# Patient Record
Sex: FEMALE | Race: WHITE | Employment: FULL TIME | URBAN - METROPOLITAN AREA
[De-identification: names, ages, dates, MRNs, and addresses within clinical notes are randomized per-mention and may not be internally consistent; named-entity substitution may affect disease eponyms.]

---

## 2024-03-11 ENCOUNTER — HOSPITAL ENCOUNTER (OUTPATIENT)
Dept: RADIOLOGY | Facility: CLINIC | Age: 24
Discharge: HOME | End: 2024-03-11
Payer: COMMERCIAL

## 2024-03-11 ENCOUNTER — OFFICE VISIT (OUTPATIENT)
Dept: PRIMARY CARE | Facility: CLINIC | Age: 24
End: 2024-03-11
Payer: COMMERCIAL

## 2024-03-11 VITALS
OXYGEN SATURATION: 98 % | SYSTOLIC BLOOD PRESSURE: 116 MMHG | HEART RATE: 62 BPM | BODY MASS INDEX: 19.56 KG/M2 | WEIGHT: 121.2 LBS | DIASTOLIC BLOOD PRESSURE: 70 MMHG

## 2024-03-11 DIAGNOSIS — M25.532 PAIN OF BOTH WRIST JOINTS: ICD-10-CM

## 2024-03-11 DIAGNOSIS — M25.531 PAIN OF BOTH WRIST JOINTS: ICD-10-CM

## 2024-03-11 DIAGNOSIS — E55.9 VITAMIN D DEFICIENCY: ICD-10-CM

## 2024-03-11 DIAGNOSIS — M25.531 PAIN OF BOTH WRIST JOINTS: Primary | ICD-10-CM

## 2024-03-11 DIAGNOSIS — M25.532 PAIN OF BOTH WRIST JOINTS: Primary | ICD-10-CM

## 2024-03-11 PROCEDURE — 73110 X-RAY EXAM OF WRIST: CPT | Mod: RT

## 2024-03-11 PROCEDURE — 1036F TOBACCO NON-USER: CPT | Performed by: PHYSICIAN ASSISTANT

## 2024-03-11 PROCEDURE — 99214 OFFICE O/P EST MOD 30 MIN: CPT | Performed by: PHYSICIAN ASSISTANT

## 2024-03-11 PROCEDURE — 73110 X-RAY EXAM OF WRIST: CPT | Mod: LT

## 2024-03-11 PROCEDURE — 73110 X-RAY EXAM OF WRIST: CPT | Mod: LEFT SIDE | Performed by: RADIOLOGY

## 2024-03-11 PROCEDURE — 73110 X-RAY EXAM OF WRIST: CPT | Mod: RIGHT SIDE | Performed by: RADIOLOGY

## 2024-03-11 ASSESSMENT — ENCOUNTER SYMPTOMS
ABDOMINAL DISTENTION: 0
ARTHRALGIAS: 1
VOMITING: 0
FEVER: 0
ABDOMINAL PAIN: 0
FATIGUE: 0
NAUSEA: 0
SHORTNESS OF BREATH: 0
BLOOD IN STOOL: 0
CONSTIPATION: 1

## 2024-03-11 ASSESSMENT — COLUMBIA-SUICIDE SEVERITY RATING SCALE - C-SSRS
2. HAVE YOU ACTUALLY HAD ANY THOUGHTS OF KILLING YOURSELF?: NO
1. IN THE PAST MONTH, HAVE YOU WISHED YOU WERE DEAD OR WISHED YOU COULD GO TO SLEEP AND NOT WAKE UP?: NO
6. HAVE YOU EVER DONE ANYTHING, STARTED TO DO ANYTHING, OR PREPARED TO DO ANYTHING TO END YOUR LIFE?: NO

## 2024-03-11 ASSESSMENT — PATIENT HEALTH QUESTIONNAIRE - PHQ9
2. FEELING DOWN, DEPRESSED OR HOPELESS: NOT AT ALL
SUM OF ALL RESPONSES TO PHQ9 QUESTIONS 1 AND 2: 0
1. LITTLE INTEREST OR PLEASURE IN DOING THINGS: NOT AT ALL

## 2024-03-11 ASSESSMENT — PAIN SCALES - GENERAL: PAINLEVEL: 2

## 2024-03-11 NOTE — PROGRESS NOTES
Subjective   Patient ID: Cathleen Marin is a 23 y.o. female who presents for Joint Pain (Pt has been having joint and wrist pain / nr) and Bloated (Pt has noticed having irregular bowel movements and bloating / nr).    HPI   Here with a few concerns. PMHx celiac disease. Feels like since she was diagnosed things are just falling apart.     Was previously having controlled BMs but over the past month has been experiencing constipation w/bloating. Denies abd pain, blood in stool. Has miralax that she's used in the past but wants to avoid using this daily again.    Also c/o b/l wrist pain. Intermittent, ongoing x 1 year. Worst w/pushups and typing at work. Occasionally feel stiff. No erythema or edema in the joints.      Review of Systems   Constitutional:  Negative for fatigue and fever.   Respiratory:  Negative for shortness of breath.    Cardiovascular:  Negative for chest pain.   Gastrointestinal:  Positive for constipation. Negative for abdominal distention, abdominal pain, blood in stool, nausea and vomiting.   Musculoskeletal:  Positive for arthralgias.   Skin:  Negative for rash.       Objective   /70   Pulse 62   Wt 55 kg (121 lb 3.2 oz)   SpO2 98%   BMI 19.56 kg/m²     Physical Exam  Constitutional:       Appearance: Normal appearance.   HENT:      Head: Normocephalic and atraumatic.   Eyes:      Extraocular Movements: Extraocular movements intact.      Conjunctiva/sclera: Conjunctivae normal.   Cardiovascular:      Rate and Rhythm: Normal rate and regular rhythm.      Heart sounds: Normal heart sounds.   Pulmonary:      Effort: Pulmonary effort is normal.      Breath sounds: Normal breath sounds. No wheezing or rhonchi.   Musculoskeletal:      Cervical back: Normal range of motion and neck supple.   Skin:     General: Skin is warm.      Findings: No rash.   Neurological:      General: No focal deficit present.      Mental Status: She is alert.         Assessment/Plan   Problem List Items  Addressed This Visit    None  Visit Diagnoses         Codes    Pain of both wrist joints    -  Primary M25.531, M25.532    Relevant Orders    BLANCA    C-reactive protein    Sedimentation Rate    XR wrist right 3+ views    XR wrist left 3+ views    Rheumatoid factor    Vitamin D deficiency     E55.9    Relevant Orders    Vitamin D 25-Hydroxy,Total (for eval of Vitamin D levels)          Will start w/imaging and labs for wrist pain. If no obvious finding, will refer to PT for arm strengthening.    Recommend dietary journal to see what could be cause of change in bowel habits. Also recommend functional med to discuss dietary changes.

## 2024-03-11 NOTE — PATIENT INSTRUCTIONS
Functional Medicine is a systems biology-based approach that focuses on identifying and addressing the root cause of disease. A diagnosis can be the result of more than one cause. For example, depression can be caused by many different factors, including inflammation. Likewise, a cause such as inflammation may lead to a number of different diagnoses, including depression. The precise manifestation of each cause depends on the individual’s genes, environment, and lifestyle, and only treatments that address the right cause will have lasting benefit beyond symptom suppression.    Dr. Deanna Yao ([facility])  [phone]  [address] [place] [address]    Elsie Tony CNP ([facility])  [phone]  [address] [address]    Dr. Jessa Phillips  [phone] [address] [place] [address]    [facility]  [phone] [address] [place] [address]    Dr. Adelaide Rutledge ([facility])  [phone] [address]  [place], [place]     Consider  at the [facility]

## 2024-03-15 ENCOUNTER — LAB (OUTPATIENT)
Dept: LAB | Facility: LAB | Age: 24
End: 2024-03-15
Payer: COMMERCIAL

## 2024-03-15 DIAGNOSIS — E55.9 VITAMIN D DEFICIENCY: ICD-10-CM

## 2024-03-15 DIAGNOSIS — M25.532 PAIN OF BOTH WRIST JOINTS: ICD-10-CM

## 2024-03-15 DIAGNOSIS — M25.531 PAIN OF BOTH WRIST JOINTS: ICD-10-CM

## 2024-03-15 LAB
25(OH)D3 SERPL-MCNC: 22 NG/ML (ref 30–100)
CRP SERPL-MCNC: 0.27 MG/DL
ERYTHROCYTE [SEDIMENTATION RATE] IN BLOOD BY WESTERGREN METHOD: 6 MM/H (ref 0–20)
RHEUMATOID FACT SER NEPH-ACNC: <10 IU/ML (ref 0–15)

## 2024-03-15 PROCEDURE — 86038 ANTINUCLEAR ANTIBODIES: CPT

## 2024-03-15 PROCEDURE — 86431 RHEUMATOID FACTOR QUANT: CPT

## 2024-03-15 PROCEDURE — 86140 C-REACTIVE PROTEIN: CPT

## 2024-03-15 PROCEDURE — 36415 COLL VENOUS BLD VENIPUNCTURE: CPT

## 2024-03-15 PROCEDURE — 85652 RBC SED RATE AUTOMATED: CPT

## 2024-03-15 PROCEDURE — 82306 VITAMIN D 25 HYDROXY: CPT

## 2024-03-18 LAB — ANA SER QL HEP2 SUBST: NEGATIVE

## 2025-03-03 ENCOUNTER — OFFICE VISIT (OUTPATIENT)
Dept: URGENT CARE | Age: 25
End: 2025-03-03
Payer: COMMERCIAL

## 2025-03-03 VITALS
SYSTOLIC BLOOD PRESSURE: 114 MMHG | DIASTOLIC BLOOD PRESSURE: 80 MMHG | TEMPERATURE: 98.6 F | OXYGEN SATURATION: 96 % | HEART RATE: 60 BPM | RESPIRATION RATE: 18 BRPM

## 2025-03-03 DIAGNOSIS — J01.00 ACUTE NON-RECURRENT MAXILLARY SINUSITIS: Primary | ICD-10-CM

## 2025-03-03 PROCEDURE — 99070 SPECIAL SUPPLIES PHYS/QHP: CPT | Performed by: NURSE PRACTITIONER

## 2025-03-03 PROCEDURE — 99213 OFFICE O/P EST LOW 20 MIN: CPT | Performed by: NURSE PRACTITIONER

## 2025-03-03 PROCEDURE — 1036F TOBACCO NON-USER: CPT | Performed by: NURSE PRACTITIONER

## 2025-03-03 RX ORDER — AMOXICILLIN AND CLAVULANATE POTASSIUM 875; 125 MG/1; MG/1
1 TABLET, FILM COATED ORAL 2 TIMES DAILY
Qty: 20 TABLET | Refills: 0 | Status: SHIPPED | OUTPATIENT
Start: 2025-03-03 | End: 2025-03-13

## 2025-03-03 ASSESSMENT — ENCOUNTER SYMPTOMS
SINUS PRESSURE: 1
SINUS PAIN: 1
RHINORRHEA: 1

## 2025-03-03 NOTE — PROGRESS NOTES
Subjective   Patient ID: Cathleen Marin is a 24 y.o. female. They present today with a chief complaint of Sinus Problem (Over 8 days ).    History of Present Illness  Cathleen Marin is a 24 y.o. female who presents to the clinic for 9 days of sinus pain and sinus pressure, rhinorrhea, postnasal drip.  Patient she is taken over-the-counter NyQuil and nasal saline solution with little relief. Pt denies any chest pain, sob, N/V at this time in clinic.             Past Medical History  Allergies as of 03/03/2025    (No Known Allergies)       (Not in a hospital admission)       No past medical history on file.    No past surgical history on file.     reports that she has never smoked. She has never used smokeless tobacco.    Review of Systems  Review of Systems   HENT:  Positive for postnasal drip, rhinorrhea, sinus pressure and sinus pain.    All other systems reviewed and are negative.                                 Objective    Vitals:    03/03/25 1809   BP: 114/80   Pulse: 60   Resp: 18   Temp: 37 °C (98.6 °F)   SpO2: 96%     Patient's last menstrual period was 02/27/2025 (exact date).    Physical Exam  Constitutional:       Appearance: Normal appearance.   HENT:      Right Ear: Tympanic membrane normal.      Left Ear: Tympanic membrane normal.      Nose:      Right Sinus: Maxillary sinus tenderness present. No frontal sinus tenderness.      Left Sinus: Maxillary sinus tenderness present. No frontal sinus tenderness.   Cardiovascular:      Rate and Rhythm: Normal rate and regular rhythm.   Pulmonary:      Effort: Pulmonary effort is normal.      Breath sounds: Normal breath sounds.   Neurological:      General: No focal deficit present.      Mental Status: She is alert and oriented to person, place, and time. Mental status is at baseline.   Psychiatric:         Mood and Affect: Mood normal.         Behavior: Behavior normal.         Procedures    Point of Care Test & Imaging Results from this visit  No  results found for this visit on 03/03/25.   No results found.    Diagnostic study results (if any) were reviewed by JUANCHO Viera.    Assessment/Plan   Allergies, medications, history, and pertinent labs/EKGs/Imaging reviewed by JUANCHO Viera.     Medical Decision Making  History and examination consistent with acute uncomplicated sinusitis. No indication for labs or imaging at this time. No evidence of sepsis, strep pharyngitis, or pneumonia. Counseled patient/family on antibiotic treatment and supportive measures at home. Patient advised to return to clinic or present to ED if symptoms change or worsen. Otherwise follow-up with PCP. Patient verbalized understanding and agrees with plan.     Pt plan:  Augmentin 1 tablet 2 times a day for 10 days  OTC probiotic for duration of ABT or increase your yogurt consumption for 10 days.  OTC flonase and zyrtec.  Please take as directed.  Please take for 10 days.  Please follow with your primary care doctor next 5 to 7 days.  If worsening symptoms, please go to ER    Orders and Diagnoses  Diagnoses and all orders for this visit:  Acute non-recurrent maxillary sinusitis  -     amoxicillin-pot clavulanate (Augmentin) 875-125 mg tablet; Take 1 tablet by mouth 2 times a day for 10 days.      Medical Admin Record      Patient disposition: Home    Electronically signed by JUANCHO Viera  6:41 PM

## 2025-03-03 NOTE — PATIENT INSTRUCTIONS
Augmentin 1 tablet 2 times a day for 10 days  OTC probiotic for duration of ABT or increase your yogurt consumption for 10 days.  OTC flonase and zyrtec.  Please take as directed.  Please take for 10 days.  Please follow with your primary care doctor next 5 to 7 days.  If worsening symptoms, please go to ER    Please follow up with your primary provider within one week if symptoms do not improve.  You may schedule an appointment online at [email or web address] or call [phone]. Go to the Emergency Department if symptoms significantly worsen or if you develop chest pain or shortness of breath.

## 2025-07-08 ENCOUNTER — APPOINTMENT (OUTPATIENT)
Dept: OTOLARYNGOLOGY | Facility: CLINIC | Age: 25
End: 2025-07-08
Payer: COMMERCIAL

## 2025-07-08 VITALS — HEIGHT: 65 IN | BODY MASS INDEX: 19.34 KG/M2 | WEIGHT: 116.1 LBS

## 2025-07-08 DIAGNOSIS — R19.6 BAD BREATH: ICD-10-CM

## 2025-07-08 DIAGNOSIS — Z00.00: Primary | ICD-10-CM

## 2025-07-08 PROCEDURE — 3008F BODY MASS INDEX DOCD: CPT

## 2025-07-08 PROCEDURE — 99202 OFFICE O/P NEW SF 15 MIN: CPT

## 2025-07-08 PROCEDURE — 1036F TOBACCO NON-USER: CPT

## 2025-07-08 ASSESSMENT — PATIENT HEALTH QUESTIONNAIRE - PHQ9
2. FEELING DOWN, DEPRESSED OR HOPELESS: NOT AT ALL
1. LITTLE INTEREST OR PLEASURE IN DOING THINGS: NOT AT ALL
SUM OF ALL RESPONSES TO PHQ9 QUESTIONS 1 AND 2: 0

## 2025-07-08 NOTE — PROGRESS NOTES
Reason For Consult  Chief Complaint   Patient presents with    New Patient Visit     Swollen tonsils         HISTORY OF PRESENT ILLNESS:  Cathleen Marin, who is a 25 y.o. female presenting for an initial visit for exam to check palantine tonsils.  The patient reports that she has been experiencing bad breath over the past couple months. Patient reports that she was seen by the dentist and recommended having her tonsils assessed. Patient reports that she is not experiencing any pain in neck or throat. Patient reports she does not have any swelling of the neck. Patient denies episodes of tonsillitis. Patient reports no voice changes. Patient reports tolerating solids, liquids, and pills when swallowing. Breathing is stable. No smoking history.     Past Medical History  She has no past medical history on file.  Surgical History  She has no past surgical history on file.     Social History  She reports that she has never smoked. She has never been exposed to tobacco smoke. She has never used smokeless tobacco. She reports that she does not currently use alcohol. She reports that she does not use drugs.    Allergies  Gluten    Review of Systems  All 10 systems were reviewed and negative except for above.      Physical Exam  CONSTITUTIONAL: Well developed, well nourished.    VOICE: normal  RESPIRATION: Breathing comfortably, no stridor.    NEURO: Alert and oriented x3, cranial nerves II-XII intact and symmetric bilaterally.    EARS: Normal external ears, external auditory canals, normal hearing to conversational voice.    NOSE: External nose midline, anterior rhinoscopy is normal with limited visualization to the anterior aspect of the interior turbinates. No lesions noted.     ORAL CAVITY/OROPHARYNX/LIPS: Normal mucous membranes, normal floor of mouth/tongue/OP, no masses or lesions are noted.    SKIN: Neck skin is intact  PSYCH: Alert and oriented with appropriate mood and affect.        Last Recorded Vitals  Height  "1.651 m (5' 5\"), weight 52.7 kg (116 lb 1.6 oz).      ASSESSMENT AND PLAN:   This is an initial visit for tonsillar hypertrophy with bad breath with clinical findings notable for 2+ bilateral tonsil tissue. No peritonsillar abscess present.  Discussed use of salt water gargles and waterpik for clearing of mucus from tonsillar crypts. Patient can use Therabreath mouthwash. No masses, nodules, or lymphadenopathy felt throughout the neck. Discussed use of home measures to prevent bad breath and cleaning of tonsillar tissue. Patient can follow up as needed.            "